# Patient Record
Sex: FEMALE | Race: WHITE | NOT HISPANIC OR LATINO | Employment: PART TIME | URBAN - METROPOLITAN AREA
[De-identification: names, ages, dates, MRNs, and addresses within clinical notes are randomized per-mention and may not be internally consistent; named-entity substitution may affect disease eponyms.]

---

## 2024-08-07 ENCOUNTER — APPOINTMENT (OUTPATIENT)
Dept: RADIOLOGY | Facility: IMAGING CENTER | Age: 74
End: 2024-08-07
Attending: PHYSICIAN ASSISTANT
Payer: MEDICARE

## 2024-08-07 ENCOUNTER — OFFICE VISIT (OUTPATIENT)
Dept: URGENT CARE | Facility: CLINIC | Age: 74
End: 2024-08-07
Payer: MEDICARE

## 2024-08-07 VITALS
HEIGHT: 70 IN | SYSTOLIC BLOOD PRESSURE: 140 MMHG | HEART RATE: 61 BPM | RESPIRATION RATE: 18 BRPM | OXYGEN SATURATION: 98 % | BODY MASS INDEX: 23.13 KG/M2 | WEIGHT: 161.6 LBS | DIASTOLIC BLOOD PRESSURE: 92 MMHG | TEMPERATURE: 98.4 F

## 2024-08-07 DIAGNOSIS — S96.919A: ICD-10-CM

## 2024-08-07 PROCEDURE — 3077F SYST BP >= 140 MM HG: CPT | Performed by: PHYSICIAN ASSISTANT

## 2024-08-07 PROCEDURE — 3080F DIAST BP >= 90 MM HG: CPT | Performed by: PHYSICIAN ASSISTANT

## 2024-08-07 PROCEDURE — 99203 OFFICE O/P NEW LOW 30 MIN: CPT | Performed by: PHYSICIAN ASSISTANT

## 2024-08-07 PROCEDURE — 73630 X-RAY EXAM OF FOOT: CPT | Mod: TC,LT | Performed by: PHYSICIAN ASSISTANT

## 2024-08-07 ASSESSMENT — ENCOUNTER SYMPTOMS
CHILLS: 0
TINGLING: 0
FEVER: 0
SENSORY CHANGE: 0

## 2024-08-07 NOTE — PROGRESS NOTES
"  Subjective:     Jasmyn Maza  is a 73 y.o. female who presents for Toe Injury (X1 week ago stubbed left pinky and now foot is swelling.)      Toe Injury  This is a new problem. The current episode started in the past 7 days. Pertinent negatives include no chills or fever.   Patient presents urgent care noting injury to left foot that occurred 1 week ago.  She states she stubbed her foot/toe on a couch.  She states it went in between her fourth and fifth digit and likely bend her small toe out.  She complains of swelling and pain primarily to small toe with some extension up over fourth and fifth metatarsals of left foot.  Denies history of surgeries or significant injuries to the left foot.  Patient is from out of town and is here for hide August 9.  She has been spending extended period of time on her feet and notes increased pain and swelling.  She has been wearing foot flops for this.  She has taken a few Advil for pain.    Review of Systems   Constitutional:  Negative for chills and fever.   Musculoskeletal:  Positive for joint pain (left foot).   Neurological:  Negative for tingling and sensory change.       Medications:    This patient does not have an active medication from one of the medication groupers.    Allergies: Patient has no known allergies.    Problem List: Jasmyn Maza does not have a problem list on file.    Surgical History:  No past surgical history on file.    Past Social Hx: Jasmyn Maza  reports that she has never smoked. She has never used smokeless tobacco. She reports that she does not currently use alcohol. She reports that she does not use drugs.     Past Family Hx:  Jasmyn Maza family history is not on file.     Problem list, medications, and allergies reviewed by myself today in Epic.     Objective:   BP (!) 140/92   Pulse 61   Temp 36.9 °C (98.4 °F) (Temporal)   Resp 18   Ht 1.778 m (5' 10\")   Wt 73.3 kg (161 lb 9.6 oz)   SpO2 98%   " BMI 23.19 kg/m²     Physical Exam  Vitals and nursing note reviewed.   Constitutional:       General: She is not in acute distress.     Appearance: She is well-developed. She is not diaphoretic.   HENT:      Head: Normocephalic and atraumatic.      Right Ear: External ear normal.      Left Ear: External ear normal.      Nose: Nose normal.   Eyes:      General: Lids are normal. No scleral icterus.        Right eye: No discharge.         Left eye: No discharge.      Conjunctiva/sclera: Conjunctivae normal.   Pulmonary:      Effort: Pulmonary effort is normal. No respiratory distress.   Musculoskeletal:         General: Normal range of motion.      Cervical back: Neck supple.      Comments: Mild edema and ecchymosis over fourth and fifth digit, slight tenderness on distal metatarsals, no fifth metatarsal head TTP, brisk capillary refill   Skin:     General: Skin is warm and dry.      Coloration: Skin is not pale.      Findings: No erythema.   Neurological:      Mental Status: She is alert and oriented to person, place, and time. She is not disoriented.   Psychiatric:         Speech: Speech normal.         Behavior: Behavior normal.     DX Foot -   FINDINGS:  No focal soft tissue swelling.  Narrowing of interphalangeal joints.  No fracture or dislocation.     IMPRESSION:     1.  No fracture or dislocation of LEFT foot.  2.  Mild osteoarthritis.           Exam Ended: 08/07/24 10:11 AM Last Resulted: 08/07/24 10:14 AM              Assessment/Plan:   Assessment      1. Strain of fifth toe  - DX-FOOT-COMPLETE 3+ LEFT; Future    Rest ice elevation OTC pain relievers, patient was offered short walking boot but declines.  Recommend supportive tennis shoes.  Follow-up with orthopedics at home if symptoms persist.    I have worn an N95 mask, gloves and eye protection for the entire encounter with this patient.     Differential diagnosis, natural history, supportive care, and indications for immediate follow-up discussed.